# Patient Record
Sex: FEMALE | Race: BLACK OR AFRICAN AMERICAN | NOT HISPANIC OR LATINO | Employment: STUDENT | URBAN - METROPOLITAN AREA
[De-identification: names, ages, dates, MRNs, and addresses within clinical notes are randomized per-mention and may not be internally consistent; named-entity substitution may affect disease eponyms.]

---
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
## 2025-01-26 NOTE — TELEPHONE ENCOUNTER
Per Lab pt with Lactic Acid of 2.1. ERMD and Resident made aware.    Spoke to mom she stated that she will try to bring daughter in today or tomorrow for repeat lab.

## 2025-03-13 ENCOUNTER — LAB (OUTPATIENT)
Dept: LAB | Facility: HOSPITAL | Age: 20
End: 2025-03-13
Payer: COMMERCIAL

## 2025-03-13 ENCOUNTER — OFFICE VISIT (OUTPATIENT)
Dept: OBSTETRICS AND GYNECOLOGY | Facility: CLINIC | Age: 20
End: 2025-03-13
Payer: COMMERCIAL

## 2025-03-13 VITALS
HEIGHT: 61 IN | DIASTOLIC BLOOD PRESSURE: 70 MMHG | SYSTOLIC BLOOD PRESSURE: 100 MMHG | WEIGHT: 121 LBS | BODY MASS INDEX: 22.84 KG/M2

## 2025-03-13 DIAGNOSIS — Z11.3 SCREENING EXAMINATION FOR STD (SEXUALLY TRANSMITTED DISEASE): ICD-10-CM

## 2025-03-13 DIAGNOSIS — Z00.00 WELL WOMAN EXAM WITHOUT GYNECOLOGICAL EXAM: ICD-10-CM

## 2025-03-13 DIAGNOSIS — Z30.09 CONTRACEPTIVE USE EDUCATION: ICD-10-CM

## 2025-03-13 DIAGNOSIS — N92.6 IRREGULAR PERIODS/MENSTRUAL CYCLES: Primary | ICD-10-CM

## 2025-03-13 LAB
B-HCG UR QL: NEGATIVE
EXPIRATION DATE: NORMAL
INTERNAL NEGATIVE CONTROL: NEGATIVE
INTERNAL POSITIVE CONTROL: POSITIVE
Lab: NORMAL

## 2025-03-13 PROCEDURE — 86803 HEPATITIS C AB TEST: CPT

## 2025-03-13 PROCEDURE — 86592 SYPHILIS TEST NON-TREP QUAL: CPT

## 2025-03-13 PROCEDURE — 87340 HEPATITIS B SURFACE AG IA: CPT

## 2025-03-13 PROCEDURE — 36415 COLL VENOUS BLD VENIPUNCTURE: CPT

## 2025-03-13 PROCEDURE — G0432 EIA HIV-1/HIV-2 SCREEN: HCPCS

## 2025-03-13 RX ORDER — DROSPIRENONE AND ETHINYL ESTRADIOL 0.03MG-3MG
1 KIT ORAL DAILY
Qty: 84 TABLET | Refills: 4 | Status: SHIPPED | OUTPATIENT
Start: 2025-03-13

## 2025-03-13 NOTE — PROGRESS NOTES
Subjective   Chief Complaint   Patient presents with    Gynecologic Exam      [facility]ly [facility] woman exam, alexs BC     Antoinette Manning is a 19 y.o. year old   presenting to be seen for her annual exam.   She has history of surgical resection of teratoma.   She is interested in starting birth control today for acne control.  She also reports irregular menstrual cycles.  She did well with Fannie in the past. She took for about a year but once her prescription ran out she did not get refilled (has been off for about a year)   She would like STI screening today as well.  SEXUAL Hx:  She is not currently sexually active.  In the past year there she has not been sexually active.    Condoms are not needed because she is not sexually active.  She would like to be screened for STD's at today's exam.  Current birth control method: abstinence.  She is not happy with her current method of contraception and does want to discuss alternative methods of contraception.  MENSTRUAL Hx:  Patient's last menstrual period was 03/10/2025 (exact date).  In the past 6 months her cycles have been regular, predictable and occur monthly.  Her menstrual flow is typically normal.   Each month on average there are roughly 0 day(s) of very heavy flow.    Intermenstrual bleeding is present.    Post-coital bleeding is n/a.  Dysmenorrhea: moderate and is not affecting her activities of daily living  PMS: mild and is not affecting her activities of daily living  Her cycles ARE a source of concern for her that she wishes to discuss today.  HEALTH Hx:  She exercises regularly: yes.  She wears her seat belt: yes.  She has concerns about domestic violence: no.  OTHER THINGS SHE WANTS TO DISCUSS TODAY:  Nothing else    The following portions of the patient's history were reviewed and updated as appropriate:problem list, current medications, allergies, past family history, past medical history, past social history, and past surgical history.    Social History    " Tobacco Use      Smoking status: Never        Passive exposure: Never      Smokeless tobacco: Never    Review of Systems  Constitutional POS: nothing reported    NEG: anorexia or night sweats   Genitourinary POS: nothing reported    NEG: dysuria or hematuria      Gastointestinal POS: nothing reported    NEG: bloating, change in bowel habits, melena, or reflux symptoms   Integument POS: nothing reported    NEG: moles that are changing in size, shape, color or rashes   Breast POS: nothing reported    NEG: persistent breast lump, skin dimpling, or nipple discharge        Objective   /70 (BP Location: Right arm, Patient Position: Sitting, Cuff Size: Adult)   Ht 154.9 cm (61\")   Wt 54.9 kg (121 lb)   LMP 03/10/2025 (Exact Date)   BMI 22.86 kg/m²     General:  well developed; well nourished  no acute distress  mentation appropriate   Skin:  No suspicious lesions seen   Thyroid: not examined   Breasts:  Not performed.   Abdomen: Not performed.   Pelvis:       UPT negative in office today  Assessment   Well woman without routine gynecological exam  Has completed hpv vaccine series      Plan     Pap was not done today.  I explained to Antoinette that the recommendations for Pap smears are to start doing PAP smears at 21 years of age.  I told Antoinette she still needs to be seen in our office yearly for an annual visit.  Urine sent for STI screening  Lab order sent for sti bloodwork  Will restart Fannie (currently menstruating) discussed can take 3 months for menstrual regulation. If persistent irregular menses after 3 months of use notify provider. Discussed to not rely on ocp for contraception through first pill pack. Discussed continued need for condoms when sexually active to decrease risk of sti transmission   The importance of keeping all planned follow-up and taking all medications as prescribed was emphasized.  Today I discussed with Antoinette the total recommended calcium intake for a premenopausal female is 1000 mg.  " Ideally this should be from dietary sources.  I reviewed calcium content in various foods including milk, fortified orange juice and yogurt.  If she cannot get sufficient calcium through dietary means, it is recommended to supplement with either a multivitamin or calcium to reach her daily goal.  I also reviewed the difference in the bioavailability of calcium carbonate and calcium citrate containing supplements and the importance of taking calcium carbonate containing products with food.  Finally, vitamin D's role in calcium absorption was reviewed and a total daily vitamin D intake of 800 units was recommended.  Has completed hpv vaccine series   Follow up for annual exam 1 year     No orders of the defined types were placed in this encounter.         This note was electronically signed.    Cecilia Anderson, ARIANA  March 13, 2025

## 2025-03-14 ENCOUNTER — OFFICE VISIT (OUTPATIENT)
Dept: INTERNAL MEDICINE | Facility: CLINIC | Age: 20
End: 2025-03-14
Payer: COMMERCIAL

## 2025-03-14 VITALS
WEIGHT: 120.8 LBS | RESPIRATION RATE: 16 BRPM | BODY MASS INDEX: 22.82 KG/M2 | TEMPERATURE: 98 F | HEART RATE: 76 BPM | SYSTOLIC BLOOD PRESSURE: 96 MMHG | DIASTOLIC BLOOD PRESSURE: 70 MMHG

## 2025-03-14 DIAGNOSIS — R56.9 SEIZURE: Primary | ICD-10-CM

## 2025-03-14 LAB
HBV SURFACE AG SERPL QL IA: NORMAL
HCV AB SER QL: NORMAL
HIV 1+2 AB+HIV1 P24 AG SERPL QL IA: NORMAL

## 2025-03-14 PROCEDURE — 99213 OFFICE O/P EST LOW 20 MIN: CPT | Performed by: INTERNAL MEDICINE

## 2025-03-14 NOTE — PROGRESS NOTES
Subjective       Antoinette Manning is a 19 y.o. female.     Chief Complaint   Patient presents with    Seizures     Feb 22rd at school        History obtained from mother, chart review, and the patient.      History of Present Illness     The patient was seen at [facility], operated by [facility] ED in [place] for a seizure.  Records have been received and reviewed.  This was in the setting of alcohol intoxication.  The patient had had a cyst history of a seizure previously Summer 2024.  She was seen at  Neurology on 8/23/2024 and had a normal EEG.  In the ED, UDS was negative with exception of benzodiazepine (had received Versed en route by ambulance).  CMP was normal with exception of a low calcium (8.5).  CBC was normal.  Ethanol level was 200.  Salicylate and acetaminophen levels were negative.  Pregnancy test was negative.  Urinalysis was negative.  Since the ED visit, she has not had a recurrence of the seizure.    History of Present Illness  The patient is a 19-year-old female who presents for evaluation of seizures. She is accompanied by her mother, who did step out of the room for the visit.    She experienced a seizure on 02/22/2025 while at college, which was followed by another seizure the subsequent day after her discharge from the hospital. The patient was drinking with some friends. She consumed approximately 3 large cups of liquor as part of a drinking game the night before the first seizure and went to sleep. Her blood alcohol level was reported to be 200. The morning after the drinking episode, she had a seizure and was transported to the emergency room via ambulance, where she received Narcan. Despite being monitored and discharged, she had another seizure later that night. Her mother contacted the hospital, who advised them to consult a neurologist. She has abstained from alcohol since the incident and reports that this was her first experience with alcohol. She has not had any further  seizures since the initial two episodes. She reports no fatigue, blurred vision, double vision, syncope, nausea, vomiting, abdominal pain, numbness, tingling, weakness, concentration issues, confusion, or agitation. She has been experiencing frequent headaches, lightheadedness, and dizziness, particularly in the mornings and at night. She also reports increased stuttering and mild anxiety. 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 She is scheduled to see her therapist next Friday. She is currently on a break from school and is due to return on 04/01/2025. She has a history of an unprovoked seizure unrelated to alcohol.    SOCIAL HISTORY  The patient admits to drinking alcohol for the first time during a drinking game with friends, consuming approximately 3 large cups of liquor.    Results  Laboratory Studies  Alcohol level was 200.    The following portions of the patient's history were reviewed and updated as appropriate: allergies, current medications, past family history, past medical history, past social history, past surgical history, and problem list.      Review of Systems   Constitutional:  Negative for fatigue.   Eyes:  Negative for visual disturbance.   Gastrointestinal:  Negative for abdominal pain and nausea.   Neurological:  Positive for dizziness, seizures, speech difficulty, light-headedness and headaches. Negative for syncope, weakness and numbness.   Psychiatric/Behavioral:  Positive for sleep disturbance. Negative for agitation, confusion and decreased concentration. The patient is nervous/anxious.            Objective     Blood pressure 96/70, pulse 76, temperature 98 °F (36.7 °C), temperature source Infrared, resp. rate 16, weight 54.8 kg (120 lb 12.8 oz), last menstrual period 03/10/2025, not currently breastfeeding.    Physical Exam  Vitals and nursing note reviewed.   Constitutional:       Appearance: She is  well-developed and normal weight.   HENT:      Head: Normocephalic and atraumatic.      Mouth/Throat:      Mouth: Mucous membranes are moist.      Pharynx: Oropharynx is clear.   Eyes:      Extraocular Movements: Extraocular movements intact.      Conjunctiva/sclera: Conjunctivae normal.      Pupils: Pupils are equal, round, and reactive to light.   Neck:      Vascular: No carotid bruit.   Cardiovascular:      Rate and Rhythm: Normal rate.      Heart sounds: Normal heart sounds. No murmur heard.  Pulmonary:      Effort: Pulmonary effort is normal.      Breath sounds: Normal breath sounds.   Musculoskeletal:         General: Normal range of motion.      Cervical back: Normal range of motion and neck supple.   Skin:     Findings: No rash.   Neurological:      Mental Status: She is alert and oriented to person, place, and time.      Cranial Nerves: No cranial nerve deficit.      Motor: Motor function is intact.      Gait: Gait normal.      Deep Tendon Reflexes: Reflexes are normal and symmetric.   Psychiatric:         Mood and Affect: Mood normal.           Assessment & Plan   Diagnoses and all orders for this visit:    1. Seizure (Primary)  -     Ambulatory Referral to Neurology    She has been advised to abstain from alcohol, drugs, vaping, and smoking, as these substances can potentially trigger seizures. It is recommended that she avoid unsupervised baths and other activities that could pose a risk if a seizure were to occur. Adequate rest and regular exercise are also encouraged. She may continue her online classes for the remainder of the semester or until her neurology appointment. A referral to neurology will be initiated for further evaluation.      Return if symptoms worsen or fail to improve.             Patient or patient representative verbalized consent for the use of Ambient Listening during the visit with  Danyelle Ren MD for chart documentation. 3/16/2025  19:31 EDT

## 2025-03-15 LAB — RPR SER QL: NON REACTIVE

## 2025-03-16 PROBLEM — R56.9 SEIZURE: Status: ACTIVE | Noted: 2025-03-16

## 2025-03-16 PROBLEM — A74.9 CHLAMYDIA INFECTION: Status: RESOLVED | Noted: 2023-10-10 | Resolved: 2025-03-16

## 2025-03-16 PROBLEM — Z30.09 CONTRACEPTIVE USE EDUCATION: Status: RESOLVED | Noted: 2022-04-30 | Resolved: 2025-03-16

## 2025-04-22 ENCOUNTER — TELEPHONE (OUTPATIENT)
Dept: INTERNAL MEDICINE | Facility: CLINIC | Age: 20
End: 2025-04-22
Payer: COMMERCIAL

## 2025-04-22 NOTE — TELEPHONE ENCOUNTER
Caller: Michelle Eng    Relationship: Mother    Best call back number: [phone]     What form or medical record are you requesting: PATIENT NEEDS A LETTER TO WITHDRAWAL FROM A CLASS. STATING THAT HER SEIZURES ARE BROUGHT ON BY STRESS AND THAT TRYING TO CATCH UP ON THESE CLASSES WOULD CAUSE HER TOO MUCH STRESS.    Who is requesting this form or medical record from you: PATIENT    How would you like to receive the form or medical records (pick-up, mail, fax):       Timeframe paperwork needed: AS SOON AS POSSIBLE    Additional notes: THIS IS DUE TO HER MISSING CLASS FROM HER SEIZURE.

## 2025-04-23 NOTE — TELEPHONE ENCOUNTER
Name: Michelle Eng    Relationship: Mother    Best Callback Number: [phone]     HUB PROVIDED THE RELAY MESSAGE FROM THE OFFICE   PATIENT VOICED UNDERSTANDING AND HAS NO FURTHER QUESTIONS AT THIS TIME    ADDITIONAL INFORMATION: THE ALGE[employer]RA CLASS, SHE IS TOO FAR BEHIND TO CATCH UP, SO THE PATIENT IS GOING TO RETAKE IT OVER THE SUMMER.

## 2025-04-23 NOTE — TELEPHONE ENCOUNTER
Attempt to call patient, no answer    Left vm for patient to return call      PLEASE RELAY:   Please call mother and see which class or classes that she is withdrawing from and any other information that might be relevant.

## 2025-04-23 NOTE — TELEPHONE ENCOUNTER
Please call mother and see which class or classes that she is withdrawing from and any other information that might be relevant.

## 2025-04-24 NOTE — TELEPHONE ENCOUNTER
THE ALGEBRA CLASS, SHE IS TOO FAR BEHIND TO CATCH UP, SO THE PATIENT IS GOING TO RETAKE IT OVER THE SUMMER.       Algebra class started Jan 2025    Will attend class in the summer around 05/14/2025.

## 2025-04-24 NOTE — TELEPHONE ENCOUNTER
Spoke with mother regarding information needed.  Letter is done and mother was informed that it will be left in the front office for her to .

## 2025-04-24 NOTE — TELEPHONE ENCOUNTER
Please ask mom the actual title of the class and the name of the college she is attending currently.

## 2025-06-04 ENCOUNTER — OFFICE VISIT (OUTPATIENT)
Dept: INTERNAL MEDICINE | Facility: CLINIC | Age: 20
End: 2025-06-04
Payer: COMMERCIAL

## 2025-06-04 VITALS
WEIGHT: 118.8 LBS | RESPIRATION RATE: 16 BRPM | SYSTOLIC BLOOD PRESSURE: 92 MMHG | HEART RATE: 80 BPM | BODY MASS INDEX: 22.45 KG/M2 | DIASTOLIC BLOOD PRESSURE: 64 MMHG | TEMPERATURE: 98.9 F

## 2025-06-04 DIAGNOSIS — T78.40XA ALLERGIC REACTION TO DRUG, INITIAL ENCOUNTER: Primary | ICD-10-CM

## 2025-06-04 RX ORDER — LEVETIRACETAM 500 MG/1
500 TABLET ORAL
COMMUNITY
Start: 2025-04-25

## 2025-06-04 RX ORDER — PREDNISONE 20 MG/1
TABLET ORAL
Qty: 11 TABLET | Refills: 0 | Status: SHIPPED | OUTPATIENT
Start: 2025-06-04

## 2025-06-04 NOTE — PROGRESS NOTES
Subjective       Antoinette Manning is a 19 y.o. female.     Chief Complaint   Patient presents with   • Rash   • Allergic Reaction     Proactiv        History obtained from mother and the patient.      History of Present Illness   History of Present Illness  The patient is a 19-year-old female who presents for evaluation of a rash. She is accompanied by her mother.    She reports the onset of a facial rash this morning, which she attributes to an allergic reaction. She initiated the use of Proactiv two days prior, applying it exclusively to her face. She experienced a burning sensation on her face yesterday, which has since escalated to a rash accompanied by facial swelling. The rash is associated with significant itching and burning. Despite not applying any topical treatments, the rash remains unchanged in severity. She reports no systemic symptoms such as fever or chills. She also reports occasional nasal congestion and a sore throat. She reports no respiratory symptoms such as runny nose, postnasal drainage, cough, wheezing, shortness of breath, chest pain, or chest tightness. Additionally, she reports no earache, headache, or tongue swelling. She has not taken Benadryl and reports no gastrointestinal symptoms such as nausea or vomiting. She confirms that the use of Proactiv is the only new addition to her routine.    Results      The following portions of the patient's history were reviewed and updated as appropriate: allergies, current medications, past family history, past medical history, past social history, past surgical history, and problem list.      Review of Systems   Constitutional:  Negative for chills and fever.   HENT:  Positive for sore throat. Negative for congestion, ear pain, postnasal drip and rhinorrhea.    Respiratory:  Negative for cough, chest tightness, shortness of breath and wheezing.    Cardiovascular:  Negative for chest pain.   Gastrointestinal:  Negative for nausea and vomiting.    Skin:  Positive for rash.   Neurological:  Negative for headaches.           Objective     Blood pressure 92/64, pulse 80, temperature 98.9 °F (37.2 °C), temperature source Infrared, resp. rate 16, weight 53.9 kg (118 lb 12.8 oz), not currently breastfeeding.    Physical Exam  Vitals and nursing note reviewed.   Constitutional:       Appearance: She is well-developed and normal weight.   HENT:      Head: Normocephalic and atraumatic.      Comments:  There is diffuse facial swelling, erythema, and warmth, with mild tenderness to palpation     Right Ear: Tympanic membrane, ear canal and external ear normal.      Left Ear: Tympanic membrane, ear canal and external ear normal.      Mouth/Throat:      Mouth: Mucous membranes are moist. No oral lesions.      Pharynx: Oropharynx is clear.      Comments: Tonsils absent.  Eyes:      Conjunctiva/sclera: Conjunctivae normal.   Cardiovascular:      Rate and Rhythm: Normal rate and regular rhythm.      Heart sounds: Normal heart sounds. No murmur heard.  Pulmonary:      Effort: Pulmonary effort is normal.      Breath sounds: Normal breath sounds.   Musculoskeletal:      Cervical back: Normal range of motion and neck supple.   Lymphadenopathy:      Cervical: No cervical adenopathy.   Skin:     Findings: No rash.   Neurological:      Mental Status: She is alert.   Psychiatric:         Mood and Affect: Mood normal.         Assessment & Plan   Diagnoses and all orders for this visit:    1. Allergic reaction to drug, initial encounter (Primary)  -     predniSONE (DELTASONE) 20 MG tablet; 2 pills daily x 3 days, then 1 pill daily x 3 days, then 1/2 pill daily x 4 days  Dispense: 11 tablet; Refill: 0    Recommend Benadryl over the counter as needed.    Return if symptoms worsen or fail to improve.             Patient or patient representative verbalized consent for the use of Ambient Listening during the visit with  Danyelle Ren MD for chart documentation. 6/4/2025  16:57 EDT